# Patient Record
Sex: FEMALE | Race: WHITE | NOT HISPANIC OR LATINO | Employment: UNEMPLOYED | URBAN - METROPOLITAN AREA
[De-identification: names, ages, dates, MRNs, and addresses within clinical notes are randomized per-mention and may not be internally consistent; named-entity substitution may affect disease eponyms.]

---

## 2020-03-03 ENCOUNTER — HOSPITAL ENCOUNTER (OUTPATIENT)
Facility: MEDICAL CENTER | Age: 35
End: 2020-03-03
Attending: OBSTETRICS & GYNECOLOGY | Admitting: OBSTETRICS & GYNECOLOGY
Payer: COMMERCIAL

## 2020-03-03 VITALS
BODY MASS INDEX: 42.27 KG/M2 | TEMPERATURE: 98.1 F | HEIGHT: 66 IN | DIASTOLIC BLOOD PRESSURE: 70 MMHG | WEIGHT: 263 LBS | HEART RATE: 80 BPM | SYSTOLIC BLOOD PRESSURE: 117 MMHG

## 2020-03-03 PROCEDURE — 99203 OFFICE O/P NEW LOW 30 MIN: CPT | Mod: 25 | Performed by: NURSE PRACTITIONER

## 2020-03-03 PROCEDURE — 59025 FETAL NON-STRESS TEST: CPT | Mod: 26 | Performed by: NURSE PRACTITIONER

## 2020-03-03 PROCEDURE — 59025 FETAL NON-STRESS TEST: CPT

## 2020-03-03 ASSESSMENT — PAIN SCALES - GENERAL: PAINLEVEL: 0 - NO PAIN

## 2020-03-04 NOTE — PROGRESS NOTES
"S: Pt is a 34 y.o.  at 34w6d with Estimated Date of Delivery: 20 who presents to triage with report of new onset of swollen feet and ankles. Pt has prenatal care in [place], [place]. Her brother was involved in major MVA and is in [facility]. She has been here at the hospital for the past several days, staying in a hotel and eating primarily fast foods. She is planning on returning to [place], but did not want to leave without first ascertaining health of pregnancy. No VB, RUCs, LOF.  Reports positive FM.      O: /70   Pulse 80   Temp 36.7 °C (98.1 °F) (Temporal)   Ht 1.676 m (5' 6\")   Wt 119.3 kg (263 lb)   BMI 42.45 kg/m²          FHTs: baseline 130, + accels, - decels, moderate variability       [place]: no UCs       SVE: deferred         A/P  There are no active problems to display for this patient.      1.  IUP @ 34w6d  2.  Reactive NST per my read.  3.  Discussed elevation of lower extremities, increase water intake and avoid high sodium and fried foods.  4.  F/u with regular OB provider.  5.  Discharge in stable condition.    Karen Rodriguez, SAMINA, APRN          "

## 2020-03-04 NOTE — PROGRESS NOTES
2256- Pt presented to labor unit for swelling in BLE, decreased fetal movements and anxiety. Pt states her brother was in massive car accident and she's been with him in ICU for last 2 days. Family dynamic has been stressful. Abd soft and non tender to touch. Pt denies UC's, VB, LOF. States has felt some fetal movements but not as many as usual. EFM and TOCO applied. POC discussed with pt. Pt verbalized understanding.     2326- SHEILA Rodriguez CNM at bedside discussing POC, reviewed fetal heart tracing at bedside. Reactive NST noted. Pt states she's feeling baby move more. Feels better since arrival.     2335- Orders received to discharge home undelivered.    2342- Discharge instructions given and explained. Pt verbalized understanding.    2345- Pt discharged home with FOB. Agrees to follow up with primary OB this week.